# Patient Record
Sex: MALE | Race: WHITE | HISPANIC OR LATINO | Employment: FULL TIME | URBAN - METROPOLITAN AREA
[De-identification: names, ages, dates, MRNs, and addresses within clinical notes are randomized per-mention and may not be internally consistent; named-entity substitution may affect disease eponyms.]

---

## 2022-04-17 ENCOUNTER — OFFICE VISIT (OUTPATIENT)
Dept: URGENT CARE | Facility: CLINIC | Age: 42
End: 2022-04-17
Payer: COMMERCIAL

## 2022-04-17 VITALS
TEMPERATURE: 96.7 F | RESPIRATION RATE: 16 BRPM | WEIGHT: 315 LBS | BODY MASS INDEX: 44.1 KG/M2 | HEIGHT: 71 IN | SYSTOLIC BLOOD PRESSURE: 130 MMHG | HEART RATE: 75 BPM | DIASTOLIC BLOOD PRESSURE: 84 MMHG | OXYGEN SATURATION: 98 %

## 2022-04-17 DIAGNOSIS — J01.00 ACUTE NON-RECURRENT MAXILLARY SINUSITIS: Primary | ICD-10-CM

## 2022-04-17 PROCEDURE — 99213 OFFICE O/P EST LOW 20 MIN: CPT | Performed by: PHYSICIAN ASSISTANT

## 2022-04-17 RX ORDER — AMOXICILLIN AND CLAVULANATE POTASSIUM 875; 125 MG/1; MG/1
1 TABLET, FILM COATED ORAL EVERY 12 HOURS SCHEDULED
Qty: 14 TABLET | Refills: 0 | Status: SHIPPED | OUTPATIENT
Start: 2022-04-17 | End: 2022-04-24

## 2022-04-17 NOTE — PROGRESS NOTES
[address] [facility]        NAME: Silva Guerrero is a 42 y o  male  : 1980    MRN: [id number]  DATE: 2022  TIME: 2:31 PM    Assessment and Plan   Acute non-recurrent maxillary sinusitis [J01 00]  1  Acute non-recurrent maxillary sinusitis  amoxicillin-clavulanate (AUGMENTIN) 875-125 mg per tablet         Patient Instructions     Patient Instructions   Take antibiotic as prescribed  Continue over-the-counter allergy medication  Can take ibuprofen or Tylenol as needed for discomfort  Follow-up with PCP  Return or be seen in ER with any progressing or worsening symptoms  Follow up with PCP in 3-5 days  Proceed to  ER if symptoms worsen  Chief Complaint     Chief Complaint   Patient presents with    Sinusitis     Pt reports of worsening sinus pain and pressure x 4 days  History of Present Illness       Patient is a 40-year-old male presenting today with cold symptoms times 10 days  Patient notes last week he was experiencing some cold-like symptoms consisting of nasal congestion, myalgias and a cough, notes he was feeling better a few days ago per reports over the last couple days he has been experiencing some facial pain and pressure, has been taking over-the-counter ibuprofen but states no resolution of his symptoms, expresses concern of possible sinus infection  Denies fever, chills, N/V/D, nasal discharge or drainage  Review of Systems   Review of Systems   Constitutional: Negative for chills and fever  HENT: Positive for congestion, sinus pressure, sinus pain and sore throat  Negative for ear pain and trouble swallowing  Eyes: Negative for pain  Respiratory: Positive for cough  Negative for chest tightness and shortness of breath  Cardiovascular: Negative for chest pain  Gastrointestinal: Negative for abdominal pain  Musculoskeletal: Negative for myalgias  Skin: Negative for rash  Neurological: Negative for headaches           Current Medications       Current Outpatient Medications:     amoxicillin-clavulanate (AUGMENTIN) 875-125 mg per tablet, Take 1 tablet by mouth every 12 (twelve) hours for 7 days, Disp: 14 tablet, Rfl: 0    Current Allergies     Allergies as of 04/17/2022    (No Known Allergies)            The following portions of the patient's history were reviewed and updated as appropriate: allergies, current medications, past family history, past medical history, past social history, past surgical history and problem list      History reviewed  No pertinent past medical history  Past Surgical History:   Procedure Laterality Date    APPENDECTOMY         History reviewed  No pertinent family history  Medications have been verified  Objective   /84   Pulse 75   Temp (!) 96 7 °F (35 9 °C)   Resp 16   Ht 5' 11" (1 803 m)   Wt (!) 147 kg (325 lb)   SpO2 98%   BMI 45 33 kg/m²        Physical Exam     Physical Exam  Vitals reviewed  Constitutional:       General: He is not in acute distress  Appearance: He is well-developed  He is not toxic-appearing  HENT:      Head: Normocephalic and atraumatic  Right Ear: Tympanic membrane, ear canal and external ear normal       Left Ear: Tympanic membrane, ear canal and external ear normal       Nose: Congestion present  Right Sinus: Maxillary sinus tenderness present  No frontal sinus tenderness  Left Sinus: Maxillary sinus tenderness present  No frontal sinus tenderness  Mouth/Throat:      Mouth: Mucous membranes are moist       Pharynx: Oropharynx is clear  Eyes:      Conjunctiva/sclera: Conjunctivae normal    Cardiovascular:      Rate and Rhythm: Normal rate and regular rhythm  Pulses: Normal pulses  Pulmonary:      Effort: Pulmonary effort is normal       Breath sounds: Normal breath sounds  Musculoskeletal:      Cervical back: Normal range of motion  No tenderness  Lymphadenopathy:      Cervical: No cervical adenopathy  Skin:     General: Skin is warm  Capillary Refill: Capillary refill takes less than 2 seconds  Neurological:      General: No focal deficit present  Mental Status: He is alert and oriented to person, place, and time

## 2022-04-17 NOTE — PATIENT INSTRUCTIONS
Take antibiotic as prescribed  Continue over-the-counter allergy medication  Can take ibuprofen or Tylenol as needed for discomfort  Follow-up with PCP  Return or be seen in ER with any progressing or worsening symptoms

## 2022-08-01 ENCOUNTER — EVALUATION (OUTPATIENT)
Dept: PHYSICAL THERAPY | Facility: CLINIC | Age: 42
End: 2022-08-01
Payer: COMMERCIAL

## 2022-08-01 DIAGNOSIS — G89.29 CHRONIC LEFT SHOULDER PAIN: ICD-10-CM

## 2022-08-01 DIAGNOSIS — M75.42 IMPINGEMENT SYNDROME OF LEFT SHOULDER: Primary | ICD-10-CM

## 2022-08-01 DIAGNOSIS — M25.512 CHRONIC LEFT SHOULDER PAIN: ICD-10-CM

## 2022-08-01 PROCEDURE — 97162 PT EVAL MOD COMPLEX 30 MIN: CPT

## 2022-08-01 NOTE — LETTER
August 10, 2022    [address]  [facility] [address]    Patient: Sonny Dennison   YOB: 1980   Date of Visit: 2022     Encounter Diagnosis     ICD-10-CM    1  Impingement syndrome of left shoulder  M75 42    2  Chronic left shoulder pain  M25 512     G89 29        Dear Dr Venegas Arrow:    Thank you for your recent referral of Sonny Dennison  Please review the attached evaluation summary from Loser's recent visit  Please verify that you agree with the plan of care by signing the attached order  If you have any questions or concerns, please do not hesitate to call  I sincerely appreciate the opportunity to share in the care of one of your patients and hope to have another opportunity to work with you in the near future  Sincerely,    Dea Scanlon, PT      Referring Provider:      I certify that I have read the below Plan of Care and certify the need for these services furnished under this plan of treatment while under my care  Elan Hollis MD  [address] [facility] [address]  Via Fax: [phone]          PT Evaluation     Today's date: 2022  Patient name: Sonny Dennison  : 1980  MRN: [id number]  Referring provider: Irena Ernst MD  Dx:   Encounter Diagnosis     ICD-10-CM    1  Impingement syndrome of left shoulder  M75 42    2  Chronic left shoulder pain  M25 512     G89 29        Start Time: 1745  Stop Time: 1835  Total time in clinic (min): 50 minutes    Assessment  Assessment details: Sonny Dennison is a 42 y o  male who presents with pain, decreased strength, decreased ROM, impaired sensation and postural dysfunction  Due to these impairments, patient has difficulty performing ADL's, work-related activities, lifting/carrying, reaching   Patient's clinical presentation is consistent with their referring diagnosis of Impingement syndrome of left shoulder  (primary encounter diagnosis) & Chronic left shoulder pain  Patient has been educated in home exercise program and plan of care  Patient would benefit from skilled physical therapy services to address their aforementioned functional limitations and progress towards prior level of function and independence with home exercise program      Impairments: abnormal or restricted ROM, activity intolerance, impaired physical strength, lacks appropriate home exercise program, pain with function, scapular dyskinesis and poor posture   Functional limitations: sleep disturbed, difficulty reaching over head  Symptom irritability: moderateBarriers to therapy: Still working full time  Understanding of Dx/Px/POC: good   Prognosis: good    Goals  Short Term Goals to be met in 4 weeks (8/29/22)  1  Pt to be independent w/ prelimary HEP  2  Pt self-correcting posture with occasional cues in clinic for improved scapular positioning  3  Improve cervical AROM to full w/o pain  4  Full shoulder AROM without pain without weights  Long Term Goals to be met in 12 weeks (10/25/22)  1  Pain-free ROM overhead for return to active overhead mobility  2  Improve shoulder strength to 4+/5 or better to allow lifting, reaching and carrying w/o c/o   3  Undisturbed sleep  4  Pt to complete work/sport tasks w/o increased pain  5  Independent with comprehensive HEP         Plan  Plan details:       Patient would benefit from: PT eval and skilled physical therapy  Planned modality interventions: cryotherapy, thermotherapy: hydrocollator packs, unattended electrical stimulation and traction  Planned therapy interventions: manual therapy, neuromuscular re-education, therapeutic exercise, therapeutic activities, home exercise program, stretching, patient education, postural training and functional ROM exercises  Other planned therapy interventions: mechanical assessment  Frequency: 2x week (2-3x/week)  Duration in weeks: 12  Plan of Care beginning date: 8/1/2022  Plan of Care expiration date: 10/24/2022  Treatment plan discussed with: patient        Subjective Evaluation    Pain  Current pain rating: 3 (w/ flx, 1/10 w/ abd)  At best pain ratin  At worst pain ratin  Quality: grinding, burning and sharp  Relieving factors: change in position and ice  Aggravating factors: overhead activity and lifting  Progression: improved (since seeing MD)    Social Support  Lives in: multiple-level home  Lives with: spouse and young children    Employment status: working ([employer])  Hand dominance: left  Exercise history: None consistently    Treatments  Current treatment: physical therapy  Patient Goals  Patient goals for therapy: decreased pain, increased motion, increased strength and return to sport/leisure activities  Patient goal: To be able to use my arm overhead without pain        Objective  ROM  L  R  Date  22  Shld flx  150*  155  Shld abd 150*  150  Shld ER 90*  90  Shld IR  75*  83    MMT   L  R  Date   22  Shld flx  4+/5  5/5  Shld ext 4+/5  5/5  Shld abd 4/5*  5/5  Shld ER 4/5*  5/5  Shld IR  4+/5*  5/5    Palpation:   +pain with palpation of proximal biceps tendon  Mild tightness elsewhere noted  Posture:   Rounded shoulders, forward head, decreased lumbar lordosis    Observation:   Pt noted to have poor scapular stability during overhead movements  Progression of pain noted over the past several months, somewhat better since MD appointment  Cervical Screening:   ROM:   Flexion nil limitation  Extension nil limitation  Rotation L nil limitation, R nil limitation  Sidebend L: min limitation*, R: nil limitation    Repeated movement testing:   Cervical retraction: Increased strength noted following 1x10 w/ therapist guidance, but tingling/numbness into L forearm noted     Cervical protraction: NT           Precautions: standard, cervical    Daily Exercise Log:    Date 22        Visit # 1                 Manual         Traction (supine)         IASTM to prox bicep tendon                           Neuro Re-Ed         S/L shld ER w/ scap stab HEP        S/L shld abd w/ scap stab         scap retraction         High row w/ scap stab         OH shld ext w/ scap stab                           Ther Ex         Supine cerv retraction HEP                                                                       Ther Activity                           Gait Training                           Modalities         CP 10' to L shoulder, dec pain, skin intact pre-post                 HEP:   Access Code: [email or web address]  URL: [email or web address]/  Date: 08/01/2022  Prepared by: Rajwinder Corporal    Exercises  · Supine Chin Tuck - 2 x daily - 7 x weekly - 2 sets - 10 reps - 3 second hold  · Seated Correct Posture - 7 x weekly  · Sidelying Shoulder External Rotation AROM - 1 x daily - 7 x weekly - 2 sets - 10 reps  · Shoulder External Rotation and Scapular Retraction - 3-4 x weekly - 2 sets - 10 reps  · Seated Cervical Rotation AROM - 4 x daily - 7 x weekly - 2 sets - 10 reps

## 2022-08-01 NOTE — PROGRESS NOTES
PT Evaluation     Today's date: 2022  Patient name: Cely Snow  : 1980  MRN: [id number]  Referring provider: Sb Chinchilla MD  Dx:   Encounter Diagnosis     ICD-10-CM    1  Impingement syndrome of left shoulder  M75 42    2  Chronic left shoulder pain  M25 512     G89 29        Start Time: 1745  Stop Time: 1835  Total time in clinic (min): 50 minutes    Assessment  Assessment details: Cely Snow is a 42 y o  male who presents with pain, decreased strength, decreased ROM, impaired sensation and postural dysfunction  Due to these impairments, patient has difficulty performing ADL's, work-related activities, lifting/carrying, reaching  Patient's clinical presentation is consistent with their referring diagnosis of Impingement syndrome of left shoulder  (primary encounter diagnosis) & Chronic left shoulder pain  Patient has been educated in home exercise program and plan of care  Patient would benefit from skilled physical therapy services to address their aforementioned functional limitations and progress towards prior level of function and independence with home exercise program      Impairments: abnormal or restricted ROM, activity intolerance, impaired physical strength, lacks appropriate home exercise program, pain with function, scapular dyskinesis and poor posture   Functional limitations: sleep disturbed, difficulty reaching over head  Symptom irritability: moderateBarriers to therapy: Still working full time  Understanding of Dx/Px/POC: good   Prognosis: good    Goals  Short Term Goals to be met in 4 weeks (22)  1  Pt to be independent w/ prelimary HEP  2  Pt self-correcting posture with occasional cues in clinic for improved scapular positioning  3  Improve cervical AROM to full w/o pain  4  Full shoulder AROM without pain without weights  Long Term Goals to be met in 12 weeks (10/25/22)  1  Pain-free ROM overhead for return to active overhead mobility    2  Improve shoulder strength to 4+/5 or better to allow lifting, reaching and carrying w/o c/o   3  Undisturbed sleep  4  Pt to complete work/sport tasks w/o increased pain  5  Independent with comprehensive HEP         Plan  Plan details:       Patient would benefit from: PT eval and skilled physical therapy  Planned modality interventions: cryotherapy, thermotherapy: hydrocollator packs, unattended electrical stimulation and traction  Planned therapy interventions: manual therapy, neuromuscular re-education, therapeutic exercise, therapeutic activities, home exercise program, stretching, patient education, postural training and functional ROM exercises  Other planned therapy interventions: mechanical assessment  Frequency: 2x week (2-3x/week)  Duration in weeks: 12  Plan of Care beginning date: 2022  Plan of Care expiration date: 10/24/2022  Treatment plan discussed with: patient        Subjective Evaluation    Pain  Current pain rating: 3 (w/ flx, 1/10 w/ abd)  At best pain ratin  At worst pain ratin  Quality: grinding, burning and sharp  Relieving factors: change in position and ice  Aggravating factors: overhead activity and lifting  Progression: improved (since seeing MD)    Social Support  Lives in: multiple-level home  Lives with: spouse and young children    Employment status: working ([employer])  Hand dominance: left  Exercise history: None consistently    Treatments  Current treatment: physical therapy  Patient Goals  Patient goals for therapy: decreased pain, increased motion, increased strength and return to sport/leisure activities  Patient goal: To be able to use my arm overhead without pain        Objective  ROM  L  R  Date  22  Shld flx  150*  155  Shld abd 150*  150  Shld ER 90*  90  Shld IR  75*  83    MMT   L  R  Date   22  Shld flx  4+/5  5/5  Shld ext 4+/5  5/5  Shld abd 4/5*  5/5  Shld ER 4/5*  5/5  Shld IR  4+/5*  5/5    Palpation:   +pain with palpation of proximal biceps tendon  Mild tightness elsewhere noted  Posture:   Rounded shoulders, forward head, decreased lumbar lordosis    Observation:   Pt noted to have poor scapular stability during overhead movements  Progression of pain noted over the past several months, somewhat better since MD appointment  Cervical Screening:   ROM:   Flexion nil limitation  Extension nil limitation  Rotation L nil limitation, R nil limitation  Sidebend L: min limitation*, R: nil limitation    Repeated movement testing:   Cervical retraction: Increased strength noted following 1x10 w/ therapist guidance, but tingling/numbness into L forearm noted  Cervical protraction: NT           Precautions: standard, cervical    Daily Exercise Log:    Date 8/1/22        Visit # 1                 Manual         Traction (supine)         IASTM to prox bicep tendon                           Neuro Re-Ed         S/L shld ER w/ scap stab HEP        S/L shld abd w/ scap stab         scap retraction         High row w/ scap stab         OH shld ext w/ scap stab                           Ther Ex         Supine cerv retraction HEP                                                                       Ther Activity                           Gait Training                           Modalities         CP 10' to L shoulder, dec pain, skin intact pre-post                 HEP:   Access Code: [email or web address]  URL: [email or web address]  Date: 08/01/2022  Prepared by: Ruthie Nguyen    Exercises  · Supine Chin Tuck - 2 x daily - 7 x weekly - 2 sets - 10 reps - 3 second hold  · Seated Correct Posture - 7 x weekly  · Sidelying Shoulder External Rotation AROM - 1 x daily - 7 x weekly - 2 sets - 10 reps  · Shoulder External Rotation and Scapular Retraction - 3-4 x weekly - 2 sets - 10 reps  · Seated Cervical Rotation AROM - 4 x daily - 7 x weekly - 2 sets - 10 reps

## 2022-08-03 ENCOUNTER — OFFICE VISIT (OUTPATIENT)
Dept: PHYSICAL THERAPY | Facility: CLINIC | Age: 42
End: 2022-08-03
Payer: COMMERCIAL

## 2022-08-03 DIAGNOSIS — M75.42 IMPINGEMENT SYNDROME OF LEFT SHOULDER: ICD-10-CM

## 2022-08-03 DIAGNOSIS — M25.512 CHRONIC LEFT SHOULDER PAIN: Primary | ICD-10-CM

## 2022-08-03 DIAGNOSIS — G89.29 CHRONIC LEFT SHOULDER PAIN: Primary | ICD-10-CM

## 2022-08-03 DIAGNOSIS — M54.12 CERVICAL RADICULOPATHY: ICD-10-CM

## 2022-08-03 PROCEDURE — 97110 THERAPEUTIC EXERCISES: CPT

## 2022-08-03 PROCEDURE — 97140 MANUAL THERAPY 1/> REGIONS: CPT

## 2022-08-03 PROCEDURE — 97112 NEUROMUSCULAR REEDUCATION: CPT

## 2022-08-03 NOTE — PROGRESS NOTES
Daily Note     Today's date: 8/3/2022  Patient name: Gaston French  : 1980  MRN: [id number]  Referring provider: Liane Birch MD  Dx:   Encounter Diagnosis     ICD-10-CM    1  Chronic left shoulder pain  M25 512     G89 29    2  Impingement syndrome of left shoulder  M75 42    3  Cervical radiculopathy  M54 12        Start Time: 1115  Stop Time: 1700  Total time in clinic (min): 45 minutes    Subjective: Patient reports continued numbness in arm following ~5 reps of seated cervical retraction, sidebend or with driving in car  Patient reports no difficulty performing cervical retraction in supine, which he has been consistent with  Objective: See treatment diary below      Assessment: Tolerated treatment well and with somewhat improved tolerance for cervical extension without UE numbness following today's session  Good understanding of information presented on repositioning posture during driving  Patient demonstrated fatigue post treatment, exhibited good technique with therapeutic exercises and would benefit from continued PT      Plan: Continue per plan of care  Progress treatment as tolerated         Precautions: standard, cervical    Daily Exercise Log:    Date 8/1/22 8/3/22       Visit # 1 2                Manual  15'       IASTM to prox bicep tendon  LS and also to L scalenes       STM  Scalenes-LS       Supine sidegliding  cervical                                  Neuro Re-Ed  5'       S/L shld ER w/ scap stab HEP Sup IR/ER   at 90 deg 2x10       S/L shld abd w/ scap stab         scap retraction         High row w/ scap stab         OH shld ext w/ scap stab                           Ther Ex  18'       Supine cerv retraction HEP 2x10 to start       Supine shld flx  2x10       Supine chest press  3# on cane, 2x10       Pt education  Reviewed symptoms, response to exercises, seating in car, with adjustments made with improved tolerance noted                                           Ther Activity                           Gait Training                           Modalities  10'       CP 10' to L shoulder, dec pain, skin intact pre-post        Mechanical traction  2x5' 25# supine       HEP:   Access Code: [email or web address]  URL: [email or web address]  Date: 08/01/2022  Prepared by: Rivera Tinoco    Exercises  · Supine Chin Tuck - 2 x daily - 7 x weekly - 2 sets - 10 reps - 3 second hold  · Seated Correct Posture - 7 x weekly  · Sidelying Shoulder External Rotation AROM - 1 x daily - 7 x weekly - 2 sets - 10 reps  · Shoulder External Rotation and Scapular Retraction - 3-4 x weekly - 2 sets - 10 reps  · Seated Cervical Rotation AROM - 4 x daily - 7 x weekly - 2 sets - 10 reps

## 2022-08-04 ENCOUNTER — OFFICE VISIT (OUTPATIENT)
Dept: PHYSICAL THERAPY | Facility: CLINIC | Age: 42
End: 2022-08-04
Payer: COMMERCIAL

## 2022-08-04 DIAGNOSIS — M54.12 CERVICAL RADICULOPATHY: ICD-10-CM

## 2022-08-04 DIAGNOSIS — M75.42 IMPINGEMENT SYNDROME OF LEFT SHOULDER: ICD-10-CM

## 2022-08-04 DIAGNOSIS — M25.512 CHRONIC LEFT SHOULDER PAIN: Primary | ICD-10-CM

## 2022-08-04 DIAGNOSIS — G89.29 CHRONIC LEFT SHOULDER PAIN: Primary | ICD-10-CM

## 2022-08-04 PROCEDURE — 97112 NEUROMUSCULAR REEDUCATION: CPT

## 2022-08-04 PROCEDURE — 97110 THERAPEUTIC EXERCISES: CPT

## 2022-08-04 NOTE — PROGRESS NOTES
Daily Note     Today's date: 2022  Patient name: Louis Hunter  : 1980  MRN: [id number]  Referring provider: Agusto Camacho MD  Dx:   Encounter Diagnosis     ICD-10-CM    1  Chronic left shoulder pain  M25 512     G89 29    2  Impingement syndrome of left shoulder  M75 42    3  Cervical radiculopathy  M54 12                   Subjective: pt reports to session with no noted discomfort, reports he was sitting in his truck and found that the numbness into his arm isnt when he is sitting up but when his head is leaning back a little resting on the head rest  Reports overall feeling well after his session last night but the intermittent numbness persists  Objective: See treatment diary below      Assessment: Tolerated treatment well  Mild increases in ant L shoulder discomfort with B/L shoulder ER but overall tolerated additional postural strengthening and positioning today  VC/TC for proper cerv retract form and had decreased discomfort with subsequent repetitions of this  Pt dem significant tightness in thoracic spine mobility, cont to address as tolerated  Patient would benefit from continued PT      Plan: Continue per plan of care        Precautions: standard, cervical    Daily Exercise Log:    Date 8/1/22 8/3/22 2022      Visit # 1 2 3                Manual  15' 5'      IASTM to prox bicep tendon  LS and also to L scalenes       STM  Scalenes-LS       Supine sidegliding  cervical       Prone cerv/thoracic PA kyle    RR                         Neuro Re-Ed  5' 30'      S/L shld ER w/ scap stab HEP Sup IR/ER   at 90 deg 2x10       S/L shld abd w/ scap stab         scap retraction         High row w/ scap stab   kirt 10# 2x10       OH shld ext w/ scap stab   kirt 10# 2x10       Corner stretch    15"x5       B/L ER w/ scap set    GTB 2x10       Shoulder horz abd w/ scap set    GTB 2x10       Prone "I"   3"x10       Prone "T"    3"x10       Median nerve glides    1x10      Ulnar nerve glide 1x10                Ther Ex  18' 10'       UBE retro    3'       Seated cerv retraction w/ self OP    2x10       Seated thoracic ext w/ MB    2x10       Supine cerv retraction HEP 2x10 to start 2x10       Supine shld flx  2x10 W/ cane 5"x15       Supine chest press  3# on cane, 2x10       Pt education  Reviewed symptoms, response to exercises, seating in car, with adjustments made with improved tolerance noted                                           Ther Activity                           Gait Training                           Modalities  10'       CP 10' to L shoulder, dec pain, skin intact pre-post        Mechanical traction  2x5' 25# supine       HEP:   Access Code: [email or web address]  URL: [email or web address]  Date: 08/01/2022  Prepared by: Jewel Qureshi    Exercises  · Supine Chin Tuck - 2 x daily - 7 x weekly - 2 sets - 10 reps - 3 second hold  · Seated Correct Posture - 7 x weekly  · Sidelying Shoulder External Rotation AROM - 1 x daily - 7 x weekly - 2 sets - 10 reps  · Shoulder External Rotation and Scapular Retraction - 3-4 x weekly - 2 sets - 10 reps  · Seated Cervical Rotation AROM - 4 x daily - 7 x weekly - 2 sets - 10 reps

## 2022-08-08 ENCOUNTER — OFFICE VISIT (OUTPATIENT)
Dept: PHYSICAL THERAPY | Facility: CLINIC | Age: 42
End: 2022-08-08
Payer: COMMERCIAL

## 2022-08-08 DIAGNOSIS — M25.512 CHRONIC LEFT SHOULDER PAIN: Primary | ICD-10-CM

## 2022-08-08 DIAGNOSIS — M54.12 CERVICAL RADICULOPATHY: ICD-10-CM

## 2022-08-08 DIAGNOSIS — M75.42 IMPINGEMENT SYNDROME OF LEFT SHOULDER: ICD-10-CM

## 2022-08-08 DIAGNOSIS — G89.29 CHRONIC LEFT SHOULDER PAIN: Primary | ICD-10-CM

## 2022-08-08 PROCEDURE — 97110 THERAPEUTIC EXERCISES: CPT

## 2022-08-08 PROCEDURE — 97112 NEUROMUSCULAR REEDUCATION: CPT

## 2022-08-08 PROCEDURE — 97012 MECHANICAL TRACTION THERAPY: CPT

## 2022-08-08 NOTE — PROGRESS NOTES
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)  Increased numbness in posterior hand with cervical extension on arrival to therapy  Objective: See treatment diary below      Assessment: Tolerated treatment well and with decreased arm numbness following supine mechanical traction  Patient demonstrated fatigue post treatment, exhibited good technique with therapeutic exercises and would benefit from continued PT  Progression of activities noted overall  Plan: Continue per plan of care  Progress treatment as tolerated         Precautions: standard, cervical    Daily Exercise Log:  POC Expires 10/25/22  Date 8/1/22 8/3/22 2022 8/8/22     Visit # 1 2 3  4              Manual  15' 5'      IASTM to prox bicep tendon  LS and also to L scalenes       STM  Scalenes-LS       Supine sidegliding  cervical       Prone cerv/thoracic PA kyle    RR  LS                       Neuro Re-Ed  5' 30' 15'     S/L shld ER w/ scap stab HEP Sup IR/ER   at 90 deg 2x10       S/L shld abd w/ scap stab         scap retraction         High row w/ scap stab   kirt 10# 2x10       OH shld ext w/ scap stab   kirt 10# 2x10       B/L ER w/ scap set    GTB 2x10  GTB 2x10     Shoulder horz abd w/ scap set    GTB 2x10       Prone "I"   3"x10  1x10 3" hold     Prone "T"    3"x10  1x10     Median nerve glides    1x10 Radial nerve glides 20x     Ulnar nerve glide    1x10                Ther Ex  18' 10'  15' UBE retro    3'  3'     Seated cerv retraction w/ self OP    2x10  2x10, inc numbness in hand, relieved after stopping     Seated thoracic ext w/ MB    2x10       Supine cerv retraction HEP 2x10 to start 2x10       Supine shld flx  2x10 W/ cane 5"x15  2x10     Supine chest press  3# on cane, 2x10       Pt education  Reviewed symptoms, response to exercises, seating in car, with adjustments made with improved tolerance noted  Reviewed current symptoms     Wall slide    10x10"     Corner stretch   15"x5  10x10"                       Ther Activity                           Gait Training                           Modalities  10'  10'     CP 10' to L shoulder, dec pain, skin intact pre-post        Mechanical traction  2x5' 25# supine  Mechanical traction 2x5' 28#, 25#      HEP:   Access Code: [email or web address]  URL: [email or web address]  Date: 08/01/2022  Prepared by: Enrico Reining    Exercises  · Supine Chin Tuck - 2 x daily - 7 x weekly - 2 sets - 10 reps - 3 second hold  · Seated Correct Posture - 7 x weekly  · Sidelying Shoulder External Rotation AROM - 1 x daily - 7 x weekly - 2 sets - 10 reps  · Shoulder External Rotation and Scapular Retraction - 3-4 x weekly - 2 sets - 10 reps  · Seated Cervical Rotation AROM - 4 x daily - 7 x weekly - 2 sets - 10 reps

## 2022-08-10 ENCOUNTER — OFFICE VISIT (OUTPATIENT)
Dept: PHYSICAL THERAPY | Facility: CLINIC | Age: 42
End: 2022-08-10
Payer: COMMERCIAL

## 2022-08-10 DIAGNOSIS — G89.29 CHRONIC LEFT SHOULDER PAIN: Primary | ICD-10-CM

## 2022-08-10 DIAGNOSIS — M75.42 IMPINGEMENT SYNDROME OF LEFT SHOULDER: ICD-10-CM

## 2022-08-10 DIAGNOSIS — M54.12 CERVICAL RADICULOPATHY: ICD-10-CM

## 2022-08-10 DIAGNOSIS — M25.512 CHRONIC LEFT SHOULDER PAIN: Primary | ICD-10-CM

## 2022-08-10 PROCEDURE — 97112 NEUROMUSCULAR REEDUCATION: CPT

## 2022-08-10 PROCEDURE — 97110 THERAPEUTIC EXERCISES: CPT

## 2022-08-10 NOTE — PROGRESS NOTES
Daily Note     Today's date: 8/10/2022  Patient name: Louis Hunter  : 1980  MRN: [id number]  Referring provider: Agusto Camacho MD  Dx:   Encounter Diagnosis     ICD-10-CM    1  Chronic left shoulder pain  M25 512     G89 29    2  Cervical radiculopathy  M54 12    3  Impingement syndrome of left shoulder  M75 42                   Subjective: He was working a lot overhead and thinks this bothered him  His shoulder felt like it was "locking up" during this, pt reports today is a good day  Unsure that the traction provides any prolonged relief  Objective: See treatment diary below      Assessment: Tolerated treatment well  Pt dem production of numbness in L UE with thoracic seated ext today but no production of symptoms with anchored thor ext today  Pt had no increases in pain post session, "I think I may be sore tomorrow " Held mechanical tx today due to no reported significant relief with use  Patient would benefit from continued PT      Plan: Continue per plan of care        Precautions: standard, cervical    Daily Exercise Log:  POC Expires 10/25/22  Date 8/1/22 8/3/22 2022 8/8/22 8/10/2022    Visit # 1 2 3  4 5 FOTO              Manual  15' 5'      IASTM to prox bicep tendon  LS and also to L scalenes       STM  Scalenes-LS       Supine sidegliding  cervical       Prone cerv/thoracic PA mobs    RR  LS RR                       Neuro Re-Ed  5' 30' 15' 25'     S/L shld ER w/ scap stab HEP Sup IR/ER   at 90 deg 2x10       S/L shld abd w/ scap stab         scap retraction         High row w/ scap stab   kirt 10# 2x10   Pain     Bent over uni row 8# DB 2x10     OH shld ext w/ scap stab   kirt 10# 2x10   kirt 10# 2x10     B/L ER w/ scap set    GTB 2x10  GTB 2x10 RTB 2x10     Shoulder horz abd w/ scap set    GTB 2x10   GTB 2x10     Prone "I"   3"x10  1x10 3" hold 3"x10    Prone "T"    3"x10  1x10 3"x10     Radian nerve glides      2x10     Median nerve glides    1x10 Radial nerve glides 20x 2x10      Ulnar nerve glide    1x10   2x10              Ther Ex  18' 10'  15' 20'     UBE retro    3'  3' 4'     Seated cerv retraction w/ self OP    2x10  2x10, inc numbness in hand, relieved after stopping     Seated thoracic ext w/ MB    2x10   2x10 * numbness into L forearm    Rhomboid stretch anchored on rail for thoracic ext     10"x10     Supine cerv retraction HEP 2x10 to start 2x10       Supine shld flx  2x10 W/ cane 5"x15  2x10     Supine chest press  3# on cane, 2x10       Pt education  Reviewed symptoms, response to exercises, seating in car, with adjustments made with improved tolerance noted  Reviewed current symptoms     Wall slide    10x10" 10x10"    Corner stretch   15"x5  10x10" 10x10"                       Ther Activity                           Gait Training                           Modalities  10'  10'     CP 10' to L shoulder, dec pain, skin intact pre-post        Mechanical traction  2x5' 25# supine  Mechanical traction 2x5' 28#, 25#      HEP:   Access Code: [email or web address]  URL: [email or web address]  Date: 08/01/2022  Prepared by: Gardenia Floyd    Exercises  · Supine Chin Tuck - 2 x daily - 7 x weekly - 2 sets - 10 reps - 3 second hold  · Seated Correct Posture - 7 x weekly  · Sidelying Shoulder External Rotation AROM - 1 x daily - 7 x weekly - 2 sets - 10 reps  · Shoulder External Rotation and Scapular Retraction - 3-4 x weekly - 2 sets - 10 reps  · Seated Cervical Rotation AROM - 4 x daily - 7 x weekly - 2 sets - 10 reps

## 2022-08-11 ENCOUNTER — OFFICE VISIT (OUTPATIENT)
Dept: PHYSICAL THERAPY | Facility: CLINIC | Age: 42
End: 2022-08-11
Payer: COMMERCIAL

## 2022-08-11 DIAGNOSIS — M25.512 CHRONIC LEFT SHOULDER PAIN: Primary | ICD-10-CM

## 2022-08-11 DIAGNOSIS — M75.42 IMPINGEMENT SYNDROME OF LEFT SHOULDER: ICD-10-CM

## 2022-08-11 DIAGNOSIS — G89.29 CHRONIC LEFT SHOULDER PAIN: Primary | ICD-10-CM

## 2022-08-11 DIAGNOSIS — M54.12 CERVICAL RADICULOPATHY: ICD-10-CM

## 2022-08-11 PROCEDURE — 97110 THERAPEUTIC EXERCISES: CPT

## 2022-08-11 PROCEDURE — 97112 NEUROMUSCULAR REEDUCATION: CPT

## 2022-08-11 NOTE — PROGRESS NOTES
Daily Note     Today's date: 2022  Patient name: Shannen Green  : 1980  MRN: [id number]  Referring provider: Amaya Andrew MD  Dx:   Encounter Diagnosis     ICD-10-CM    1  Chronic left shoulder pain  M25 512     G89 29    2  Cervical radiculopathy  M54 12    3  Impingement syndrome of left shoulder  M75 42                   Subjective: pt reports that he was tired after his last session, he woke up this morning with his shoulder being sore he took some meds and made it feel better, no noted pain on arrival to session  Objective: See treatment diary below  Repeated shoulder ext w/ cane 1x10=Dec/B   Repeat shoulder ext hand on counter 1x10=Dec/ B w/ AROM shoulder flex   Repeated L SB= Incr/W numbness into L median nerve distribution       Assessment: Tolerated treatment well  Pt had production of L hand numbness with retract/ext/manual tx/central sliders today, does not appear to present with clear directional preference today  Pt will be away for 2 weeks, edu to cont with repeated shoulder ext and HEP that is not producing symptoms while away until his return  Cont to be aware of provoking positions  Patient would benefit from continued PT      Plan: Continue per plan of care        Precautions: standard, cervical    Daily Exercise Log:  POC Expires 10/25/22  Date 8/1/22 8/3/22 2022 8/8/22 8/10/2022 2022   Visit # 1 2 3  4 5 FOTO  6             Manual  15' 5'   5'    IASTM to prox bicep tendon  LS and also to L scalenes       STM  Scalenes-LS       Supine sidegliding  cervical       Prone cerv/thoracic PA mobs    RR  LS RR     Manual tx      RR    Central sliders median bias       1x10   Neuro Re-Ed  5' 30' 15' 25'  10'    S/L shld ER w/ scap stab HEP Sup IR/ER   at 90 deg 2x10       S/L shld abd w/ scap stab         scap retraction         High row w/ scap stab   kirt 10# 2x10   Pain     Bent over uni row 8# DB 2x10     OH shld ext w/ scap stab   kirt 10# 2x10   kirt 10# 2x10 B/L ER w/ scap set    GTB 2x10  GTB 2x10 RTB 2x10     Shoulder horz abd w/ scap set    GTB 2x10   GTB 2x10     Prone "I"   3"x10  1x10 3" hold 3"x10    Prone "T"    3"x10  1x10 3"x10     Radian nerve glides      2x10     Median nerve glides    1x10 Radial nerve glides 20x 2x10   Median Standing 1x10 wrist ext neutral arm * numbness    Ulnar nerve glide    1x10   2x10              Ther Ex  18' 10'  15' 20'  30'    UBE retro    3'  3' 4'  4'  Numbness into hand last min    Seated cerv retraction w/ self OP    2x10  2x10, inc numbness in hand, relieved after stopping  L hand numbness    Seated thoracic ext w/ MB    2x10   2x10 * numbness into L forearm    Rhomboid stretch anchored on rail for thoracic ext     10"x10     Supine cerv retraction HEP 2x10 to start 2x10       Supine shld flx  2x10 W/ cane 5"x15  2x10     Supine chest press  3# on cane, 2x10       Pt education  Reviewed symptoms, response to exercises, seating in car, with adjustments made with improved tolerance noted  Reviewed current symptoms     Wall slide    10x10" 10x10"    Corner stretch   15"x5  10x10" 10x10"  10"x10    Mechanical assessment      20'             Ther Activity                           Gait Training                           Modalities  10'  10'     CP 10' to L shoulder, dec pain, skin intact pre-post        Mechanical traction  2x5' 25# supine  Mechanical traction 2x5' 28#, 25#      HEP:   Access Code: [email or web address]  URL: [email or web address]  Date: 08/01/2022  Prepared by: Erika Barrier    Exercises  · Supine Chin Tuck - 2 x daily - 7 x weekly - 2 sets - 10 reps - 3 second hold  · Seated Correct Posture - 7 x weekly  · Sidelying Shoulder External Rotation AROM - 1 x daily - 7 x weekly - 2 sets - 10 reps  · Shoulder External Rotation and Scapular Retraction - 3-4 x weekly - 2 sets - 10 reps  · Seated Cervical Rotation AROM - 4 x daily - 7 x weekly - 2 sets - 10 reps

## 2022-08-15 ENCOUNTER — APPOINTMENT (OUTPATIENT)
Dept: PHYSICAL THERAPY | Facility: CLINIC | Age: 42
End: 2022-08-15
Payer: COMMERCIAL

## 2022-08-17 ENCOUNTER — APPOINTMENT (OUTPATIENT)
Dept: PHYSICAL THERAPY | Facility: CLINIC | Age: 42
End: 2022-08-17
Payer: COMMERCIAL

## 2022-08-18 ENCOUNTER — APPOINTMENT (OUTPATIENT)
Dept: PHYSICAL THERAPY | Facility: CLINIC | Age: 42
End: 2022-08-18
Payer: COMMERCIAL

## 2022-08-22 ENCOUNTER — EVALUATION (OUTPATIENT)
Dept: PHYSICAL THERAPY | Facility: CLINIC | Age: 42
End: 2022-08-22
Payer: COMMERCIAL

## 2022-08-22 DIAGNOSIS — M54.12 CERVICAL RADICULOPATHY: ICD-10-CM

## 2022-08-22 DIAGNOSIS — M25.512 CHRONIC LEFT SHOULDER PAIN: Primary | ICD-10-CM

## 2022-08-22 DIAGNOSIS — M75.42 IMPINGEMENT SYNDROME OF LEFT SHOULDER: ICD-10-CM

## 2022-08-22 DIAGNOSIS — G89.29 CHRONIC LEFT SHOULDER PAIN: Primary | ICD-10-CM

## 2022-08-22 PROCEDURE — 97140 MANUAL THERAPY 1/> REGIONS: CPT

## 2022-08-22 PROCEDURE — 97110 THERAPEUTIC EXERCISES: CPT

## 2022-08-22 PROCEDURE — 97112 NEUROMUSCULAR REEDUCATION: CPT

## 2022-08-22 NOTE — PROGRESS NOTES
PT Re-Evaluation     Today's date: 2022  Patient name: Kinza Dolan  : 1980  MRN: [id number]  Referring provider: Adriana Sanders MD  Dx:   Encounter Diagnosis     ICD-10-CM    1  Chronic left shoulder pain  M25 512     G89 29    2  Cervical radiculopathy  M54 12    3  Impingement syndrome of left shoulder  M75 42        Start Time: 1700  Stop Time: 1745  Total time in clinic (min): 45 minutes    Assessment  Assessment details: 22:   Patient demonstrating improving ROM, strength, decreased guarding and increased shoulder function  Patient notes he did go to chiropractor who adjusted his back, with decreased numbness noted since then  Patient progressing well toward patient and therapist goals, with patient demonstrating continued impaired strength in external rotation and abduction, with pain remaining in these directions as well  Patient noting overall improvements in function, with continued impairments noted as above  Patient will benefit from continued skilled PT to address strengthening, stretching, scapular stabilization, and scapulohumeral rhythm to prevent continued impingement in his L arm      22: Kinza Dolan is a 42 y o  male who presents with pain, decreased strength, decreased ROM, impaired sensation and postural dysfunction  Due to these impairments, patient has difficulty performing ADL's, work-related activities, lifting/carrying, reaching  Patient's clinical presentation is consistent with their referring diagnosis of Impingement syndrome of left shoulder  (primary encounter diagnosis) & Chronic left shoulder pain  Patient has been educated in home exercise program and plan of care   Patient would benefit from skilled physical therapy services to address their aforementioned functional limitations and progress towards prior level of function and independence with home exercise program      Impairments: abnormal or restricted ROM, impaired physical strength, pain with function, scapular dyskinesis and poor posture   Functional limitations: sleep disturbed, difficulty reaching over head  Symptom irritability: moderateBarriers to therapy: Still working full time in a physical job  Understanding of Dx/Px/POC: good   Prognosis: good    Goals  Short Term Goals to be met in 4 weeks (8/29/22)  1  Pt to be independent w/ prelimary HEP  MET  2  Pt self-correcting posture with occasional cues in clinic for improved scapular positioning  MET  3  Improve cervical AROM to full w/o pain  MET  4  Full shoulder AROM without pain without weights  MET    Long Term Goals to be met in 12 weeks (10/25/22) all progressing--not yet achieved  Continue goals  1  Pain-free ROM overhead for return to active overhead mobility  2  Improve shoulder strength to 4+/5 or better to allow lifting, reaching and carrying w/o c/o   3  Undisturbed sleep  4  Pt to complete work/sport tasks w/o increased pain  5  Independent with comprehensive HEP  Plan  Plan details:       Patient would benefit from: skilled physical therapy  Planned modality interventions: cryotherapy, thermotherapy: hydrocollator packs, unattended electrical stimulation and traction  Planned therapy interventions: manual therapy, neuromuscular re-education, therapeutic exercise, therapeutic activities, home exercise program, stretching, patient education, postural training, functional ROM exercises and joint mobilization  Other planned therapy interventions: mechanical assessment  Frequency: 2-3x/week  Duration in weeks: 12  Plan of Care beginning date: 8/1/2022  Plan of Care expiration date: 10/24/2022  Treatment plan discussed with: patient        Subjective Evaluation    History of Present Illness  Mechanism of injury: Patient reports gradual onset of pain in his left shoulder, which he has ignored for many months   With recent onset of numbness into his arm, decided to seek medical attention and presents for OPPT following visit to orthopedic MD            Not a recurrent problem   Quality of life: good    Pain  Current pain ratin (w/ flx, 10 w/ abd)  At best pain ratin  At worst pain ratin  Location: L shoulder  Quality: grinding, sharp, dull ache and discomfort  Relieving factors: change in position and ice  Aggravating factors: overhead activity and lifting  Progression: improved (since seeing MD)    Social Support  Lives in: multiple-level home  Lives with: spouse and young children    Employment status: working ([address])  Hand dominance: left  Exercise history: None consistently    Treatments  Current treatment: chiropractic and physical therapy  Patient Goals  Patient goals for therapy: decreased pain, increased motion, increased strength and return to sport/leisure activities  Patient goal: To be able to use my arm overhead without pain        Objective  ROM  L  L  R  Date  22  Shld flx  158  150*  155  Shld abd 162  150*  150  Shld ER 75*  90*  90  Shld IR  63  75*  83    MMT   L  L  R  Date   22  Shld flx  4+/5  4+/5  5/5  Shld ext 5/5  4+/5  5/5  Shld abd 4+/5  4/5*  5/5  Shld ER 4-/5*  4/5*  5/5  Shld IR  4+/5  4+/5*  5/5    Palpation:   22: No pain with palpation of biceps tendon or around shoulder mm  Patient notes tightness with palpation but without the sharp pain he was having before  22: +pain with palpation of proximal biceps tendon  Mild tightness elsewhere noted  Posture:  22: Continues to utilize forward head posture, with some improvement noted overall  Able to correct better with cues  22: Rounded shoulders, forward head, decreased lumbar lordosis    Observation:   22: Improving scapular stabilization with overhead reach, but with continued requirement for tactile or verbal cues, with patient noted to have improving ease of initiating scapular stabilization    22: Pt noted to have poor scapular stability during overhead movements  Progression of pain noted over the past several months, somewhat better since MD appointment  Cervical Screening:   ROM:   22:   Flexion nil limitation  Extension nil limitation  Rotation L nil limitation, R nil limitation  Sidebend L: nil limitation, R: nil limitation    22:   Flexion nil limitation  Extension nil limitation  Rotation L nil limitation, R nil limitation  Sidebend L: min limitation*, R: nil limitation    Repeated movement testin22: Not tested, pt reporting no numbness since chiropractic adjustment on   Notes improved ease of completing cervical retractions without UE numbness now  22:   Cervical retraction: Increased strength noted following 1x10 w/ therapist guidance, but tingling/numbness into L forearm noted     Cervical protraction: NT         Precautions: standard, cervical    Daily Exercise Log:  POC Expires 10/25/22  Date 22   Visit # 7     6             Manual 5'     5'    IASTM to prox bicep tendon         STM         Prone cerv/thoracic PA mobs          Manual tx LS     RR    Central sliders median bias       1x10                     Neuro Re-Ed 20'     10'    S/L shld ER w/ scap stab 2x10 tactile/ verbal cues for scap stab        S/L shld abd w/ scap stab 2x10, tactile/ verbal cues for scap stab        scap retraction         High row w/ scap stab         OH shld ext w/ scap stab 9 1# [place] 2x10        B/L ER w/ scap set          Shoulder horz abd w/ scap set          Prone "I" 2x10        Prone "T"  2x10        Radian nerve glides          Median nerve glides  Median standing 20x wrist ext neutral arm, no numbness     Median Standing 1x10 wrist ext neutral arm * numbness    Ulnar nerve glide                   Ther Ex 15'     30'    UBE retro       4'  Numbness into hand last min    Seated cerv retraction w/ self OP  held     L hand numbness    Seated thoracic ext w/ MB          Rhomboid stretch anchored on rail for thoracic ext         Supine cerv retraction         Supine shld flx         Supine chest press         Shoulder extension stretch w/ cane 10x10"        Pt education         Wall slide 10"x5        Corner stretch 10"x5     10"x10    Mechanical assessment      20'    ROM/MMT reassess LS                                            Ther Activity                           Gait Training                           Modalities         CP         Mechanical traction         HEP:   Access Code: [email or web address]  URL: [email or web address]  Date: 08/01/2022  Prepared by: Miracle Milner    Exercises  · Supine Chin Tuck - 2 x daily - 7 x weekly - 2 sets - 10 reps - 3 second hold  · Seated Correct Posture - 7 x weekly  · Sidelying Shoulder External Rotation AROM - 1 x daily - 7 x weekly - 2 sets - 10 reps  · Shoulder External Rotation and Scapular Retraction - 3-4 x weekly - 2 sets - 10 reps  · Seated Cervical Rotation AROM - 4 x daily - 7 x weekly - 2 sets - 10 reps

## 2022-08-24 ENCOUNTER — APPOINTMENT (OUTPATIENT)
Dept: PHYSICAL THERAPY | Facility: CLINIC | Age: 42
End: 2022-08-24
Payer: COMMERCIAL

## 2022-08-25 ENCOUNTER — OFFICE VISIT (OUTPATIENT)
Dept: PHYSICAL THERAPY | Facility: CLINIC | Age: 42
End: 2022-08-25
Payer: COMMERCIAL

## 2022-08-25 DIAGNOSIS — G89.29 CHRONIC LEFT SHOULDER PAIN: Primary | ICD-10-CM

## 2022-08-25 DIAGNOSIS — M75.42 IMPINGEMENT SYNDROME OF LEFT SHOULDER: ICD-10-CM

## 2022-08-25 DIAGNOSIS — M54.12 CERVICAL RADICULOPATHY: ICD-10-CM

## 2022-08-25 DIAGNOSIS — M25.512 CHRONIC LEFT SHOULDER PAIN: Primary | ICD-10-CM

## 2022-08-25 PROCEDURE — 97110 THERAPEUTIC EXERCISES: CPT

## 2022-08-25 PROCEDURE — 97112 NEUROMUSCULAR REEDUCATION: CPT

## 2022-08-25 NOTE — PROGRESS NOTES
Daily Note     Today's date: 2022  Patient name: Earnest Hayes  : 1980  MRN: [id number]  Referring provider: Sara Rooj MD  Dx:   Encounter Diagnosis     ICD-10-CM    1  Chronic left shoulder pain  M25 512     G89 29    2  Cervical radiculopathy  M54 12    3  Impingement syndrome of left shoulder  M75 42        Start Time: 1700  Stop Time: 1745  Total time in clinic (min): 45 minutes    Subjective: Patient reporting pain only in the morning now  Overall, improvements noted  Objective: See treatment diary below      Assessment: Tolerated treatment well and with mild-moderate fatigue and soreness noted by end of session  Progression of activities tolerated well, with continued cues and emphasis required for scapular stabilization  Patient demonstrated fatigue post treatment, exhibited good technique with therapeutic exercises and would benefit from continued PT      Plan: Continue per plan of care  Progress treatment as tolerated         Precautions: standard, cervical    Daily Exercise Log:  POC Expires 10/25/22  Date 22       Visit # 7 8                Manual 5'        IASTM to prox bicep tendon         STM         Prone cerv/thoracic PA mobs          Manual tx LS        Central sliders median bias                            Neuro Re-Ed 20' 20'       S/L shld ER w/ scap stab 2x10 tactile/ verbal cues for scap stab        S/L shld abd w/ scap stab 2x10, tactile/ verbal cues for scap stab        scap retraction  Green tubing 2x15       High row w/ scap stab  Seated 2x15 11 4# B/L       OH shld ext w/ scap stab 9 1# [place] 2x10 Green tubing  2x10       B/L ER w/ scap set   RTB 2x15       Shoulder horz abd w/ scap set  2x10 2x10       Prone "I" 2x10 2x10 w/ scap set       Radian nerve glides          Median nerve glides  Median standing 20x wrist ext neutral arm, no numbness        Ulnar nerve glide                   Ther Ex 15' 19'       Pulleys   3'       UBE retro   L1 x 3' Seated cerv retraction w/ self OP  held        Seated thoracic ext w/ MB          Supine cerv retraction         Supine shld flx  3# 2x10 in painfree ROM       Supine chest press  3# 2x10 B/L in painfree ROM       Shoulder extension stretch w/ cane 10x10" 10"x10       Pt education         Wall slide 10"x5 10"x5       Corner stretch 10"x5 15"x5        Mechanical assessment         ROM/MMT reassess LS                                            Ther Activity                           Gait Training                           Modalities         CP         Mechanical traction         HEP:   Access Code: [email or web address]  URL: [email or web address]  Date: 08/01/2022  Prepared by: Phylicia Pear    Exercises  · Supine Chin Tuck - 2 x daily - 7 x weekly - 2 sets - 10 reps - 3 second hold  · Seated Correct Posture - 7 x weekly  · Sidelying Shoulder External Rotation AROM - 1 x daily - 7 x weekly - 2 sets - 10 reps  · Shoulder External Rotation and Scapular Retraction - 3-4 x weekly - 2 sets - 10 reps  · Seated Cervical Rotation AROM - 4 x daily - 7 x weekly - 2 sets - 10 reps

## 2022-08-29 ENCOUNTER — OFFICE VISIT (OUTPATIENT)
Dept: PHYSICAL THERAPY | Facility: CLINIC | Age: 42
End: 2022-08-29
Payer: COMMERCIAL

## 2022-08-29 DIAGNOSIS — G89.29 CHRONIC LEFT SHOULDER PAIN: Primary | ICD-10-CM

## 2022-08-29 DIAGNOSIS — M25.512 CHRONIC LEFT SHOULDER PAIN: Primary | ICD-10-CM

## 2022-08-29 DIAGNOSIS — M54.12 CERVICAL RADICULOPATHY: ICD-10-CM

## 2022-08-29 DIAGNOSIS — M75.42 IMPINGEMENT SYNDROME OF LEFT SHOULDER: ICD-10-CM

## 2022-08-29 PROCEDURE — 97110 THERAPEUTIC EXERCISES: CPT

## 2022-08-29 PROCEDURE — 97112 NEUROMUSCULAR REEDUCATION: CPT

## 2022-08-29 NOTE — PROGRESS NOTES
PT Discharge    Today's date: 2022  Patient name: Osbaldo Howell  : 1980  MRN: [id number]  Referring provider: Shalom Bobby MD  Dx:   Encounter Diagnosis     ICD-10-CM    1  Chronic left shoulder pain  M25 512     G89 29    2  Cervical radiculopathy  M54 12    3  Impingement syndrome of left shoulder  M75 42        Start Time: 1700  Stop Time: 1735  Total time in clinic (min): 35 minutes    Assessment  Assessment details: 22: Patient arrived to session reporting feeling back to his baseline, which is some mild pain typically, and informed therapist that today would be his last session  Patient demonstrating good understanding of HEP which was updated today  Patient aware that he can return if necessary in the future  Pleased with his results at this time  22:   Patient demonstrating improving ROM, strength, decreased guarding and increased shoulder function  Patient notes he did go to chiropractor who adjusted his back, with decreased numbness noted since then  Patient progressing well toward patient and therapist goals, with patient demonstrating continued impaired strength in external rotation and abduction, with pain remaining in these directions as well  Patient noting overall improvements in function, with continued impairments noted as above  Patient will benefit from continued skilled PT to address strengthening, stretching, scapular stabilization, and scapulohumeral rhythm to prevent continued impingement in his L arm      22: Osbaldo Howell is a 42 y o  male who presents with pain, decreased strength, decreased ROM, impaired sensation and postural dysfunction  Due to these impairments, patient has difficulty performing ADL's, work-related activities, lifting/carrying, reaching  Patient's clinical presentation is consistent with their referring diagnosis of Impingement syndrome of left shoulder  (primary encounter diagnosis) & Chronic left shoulder pain   Patient has been educated in home exercise program and plan of care  Patient would benefit from skilled physical therapy services to address their aforementioned functional limitations and progress towards prior level of function and independence with home exercise program      Barriers to therapy: Still working full time in a physical job  Understanding of Dx/Px/POC: good   Prognosis: good    Goals  Short Term Goals to be met in 4 weeks (22)  1  Pt to be independent w/ prelimary HEP  MET  2  Pt self-correcting posture with occasional cues in clinic for improved scapular positioning  MET  3  Improve cervical AROM to full w/o pain  MET  4  Full shoulder AROM without pain without weights  MET    Long Term Goals to be met in 12 weeks (10/25/22)  1  Pain-free ROM overhead for return to active overhead mobility  MET  2  Improve shoulder strength to 4+/5 or better to allow lifting, reaching and carrying w/o c/o  Mostly MET  3  Undisturbed sleep  MET  4  Pt to complete work/sport tasks w/o increased pain  MET  5  Independent with comprehensive HEP  MET      Plan  Plan details:       Frequency: 2-3x/week  Duration in weeks: 12  Plan of Care beginning date: 2022  Plan of Care expiration date: 10/24/2022  Treatment plan discussed with: patient        Subjective Evaluation    History of Present Illness  Mechanism of injury: Patient reports gradual onset of pain in his left shoulder, which he has ignored for many months   With recent onset of numbness into his arm, decided to seek medical attention and presents for OPPT following visit to orthopedic MD            Not a recurrent problem   Quality of life: good    Pain  Current pain ratin  At best pain ratin  At worst pain ratin  Location: L shoulder  Progression: resolved (to baseline symptoms)    Social Support  Lives in: multiple-level home  Lives with: spouse and young children    Employment status: working ([address])  Hand dominance: left  Exercise history: None consistently    Treatments  Current treatment: chiropractic and physical therapy        Objective  ROM  L  L  L  R  Date  22  Shld flx  160  158  150*  155  Shld abd 168  162  150*  150  Shld ER 80  75*  90*  90  Shld IR  75  63  75*  83    MMT   L  L  L  R  Date   22  Shld flx  4+/5  4+/5  4+/5  5/5  Shld ext 5/5  5/5  4+/5  5/5  Shld abd 4+/5  4+/5  4/5*  5/5  Shld ER 4/5  4-/5*  4/5*  5/5  Shld IR  4+/5*  4+/5  4+/5*  5/5    Palpation:   22: No pain with palpation of biceps tendon or around shoulder mm  Patient notes tightness with palpation but without the sharp pain he was having before  22: +pain with palpation of proximal biceps tendon  Mild tightness elsewhere noted  Posture:  22: Patient noted to have some improvements in posture  Progressing well overall  22: Continues to utilize forward head posture, with some improvement noted overall  Able to correct better with cues  22: Rounded shoulders, forward head, decreased lumbar lordosis    Observation:   22: Improving scapular stabilization with overhead reach, but with continued requirement for tactile or verbal cues, with patient noted to have improving ease of initiating scapular stabilization  22: Pt noted to have poor scapular stability during overhead movements  Progression of pain noted over the past several months, somewhat better since MD appointment  Cervical Screening:   ROM:   22:   Flexion nil limitation  Extension nil limitation  Rotation L nil limitation, R nil limitation  Sidebend L: nil limitation, R: nil limitation    22:   Flexion nil limitation  Extension nil limitation  Rotation L nil limitation, R nil limitation  Sidebend L: min limitation*, R: nil limitation    Repeated movement testin22: Not tested, pt reporting no numbness since chiropractic adjustment on    Notes improved ease of completing cervical retractions without UE numbness now  8/1/22:   Cervical retraction: Increased strength noted following 1x10 w/ therapist guidance, but tingling/numbness into L forearm noted  Cervical protraction: NT         Precautions: standard, cervical    Daily Exercise Log:  POC Expires 10/25/22  Date 8/22/22 8/25/22 8/29/22      Visit # 7 8 9               Manual 5'        IASTM to prox bicep tendon         STM         Prone cerv/thoracic PA mobs          Manual tx LS        Central sliders median bias                            Neuro Re-Ed 20' 20' 10'      S/L shld ER w/ scap stab 2x10 tactile/ verbal cues for scap stab        S/L shld abd w/ scap stab 2x10, tactile/ verbal cues for scap stab        scap retraction  Green tubing 2x15 RTB 2x15      High row w/ scap stab  Seated 2x15 11 4# B/L       OH shld ext w/ scap stab 9 1# [place] 2x10 Green tubing  2x10       B/L ER w/ scap set   RTB 2x15 RTB std 2x10      Shoulder horz abd w/ scap set  2x10 2x10 Standing RTB 2x10      Prone "I" 2x10 2x10 w/ scap set       Radian nerve glides          Median nerve glides  Median standing 20x wrist ext neutral arm, no numbness        Ulnar nerve glide                   Ther Ex 15' 19' 20'      Pulleys   3'       UBE retro   L1 x 3'        Seated cerv retraction w/ self OP  held        Seated thoracic ext w/ MB          Supine cerv retraction         Supine shld flx  3# 2x10 in painfree ROM       Supine chest press  3# 2x10 B/L in painfree ROM       Shoulder extension stretch w/ cane 10x10" 10"x10       Pt education         Wall slide 10"x5 10"x5 10"x5      Corner stretch 10"x5 15"x5  10"x5      Mechanical assessment         ROM/MMT reassess LS  LS      HEP   Updated & reviewed                                 Ther Activity                           Gait Training                           Modalities         CP         Mechanical traction         HEP:   Access Code: [email or web address]  URL: [email or web address]  Date: 08/01/2022  Prepared by: Rivera Tinoco    Exercises  · Supine Chin Tuck - 2 x daily - 7 x weekly - 2 sets - 10 reps - 3 second hold  · Seated Correct Posture - 7 x weekly  · Sidelying Shoulder External Rotation AROM - 1 x daily - 7 x weekly - 2 sets - 10 reps  · Shoulder External Rotation and Scapular Retraction - 3-4 x weekly - 2 sets - 10 reps  · Seated Cervical Rotation AROM - 4 x daily - 7 x weekly - 2 sets - 10 reps

## 2025-01-19 ENCOUNTER — APPOINTMENT (OUTPATIENT)
Dept: LAB | Facility: HOSPITAL | Age: 45
End: 2025-01-19
Payer: COMMERCIAL

## 2025-01-19 DIAGNOSIS — R73.03 DIABETES MELLITUS, LATENT: ICD-10-CM

## 2025-01-19 DIAGNOSIS — E78.00 PURE HYPERCHOLESTEROLEMIA: ICD-10-CM

## 2025-01-19 LAB
ALBUMIN SERPL BCG-MCNC: 4.3 G/DL (ref 3.5–5)
ALP SERPL-CCNC: 59 U/L (ref 34–104)
ALT SERPL W P-5'-P-CCNC: 22 U/L (ref 7–52)
ANION GAP SERPL CALCULATED.3IONS-SCNC: 7 MMOL/L (ref 4–13)
AST SERPL W P-5'-P-CCNC: 15 U/L (ref 13–39)
BILIRUB SERPL-MCNC: 0.42 MG/DL (ref 0.2–1)
BUN SERPL-MCNC: 17 MG/DL (ref 5–25)
CALCIUM SERPL-MCNC: 8.6 MG/DL (ref 8.4–10.2)
CHLORIDE SERPL-SCNC: 106 MMOL/L (ref 96–108)
CHOLEST SERPL-MCNC: 180 MG/DL (ref ?–200)
CO2 SERPL-SCNC: 26 MMOL/L (ref 21–32)
CREAT SERPL-MCNC: 0.99 MG/DL (ref 0.6–1.3)
EST. AVERAGE GLUCOSE BLD GHB EST-MCNC: 120 MG/DL
GFR SERPL CREATININE-BSD FRML MDRD: 91 ML/MIN/1.73SQ M
GLUCOSE P FAST SERPL-MCNC: 116 MG/DL (ref 65–99)
HBA1C MFR BLD: 5.8 %
HDLC SERPL-MCNC: 44 MG/DL
LDLC SERPL CALC-MCNC: 124 MG/DL (ref 0–100)
NONHDLC SERPL-MCNC: 136 MG/DL
POTASSIUM SERPL-SCNC: 4.1 MMOL/L (ref 3.5–5.3)
PROT SERPL-MCNC: 7.1 G/DL (ref 6.4–8.4)
SODIUM SERPL-SCNC: 139 MMOL/L (ref 135–147)
TRIGL SERPL-MCNC: 62 MG/DL (ref ?–150)
TSH SERPL DL<=0.05 MIU/L-ACNC: 1.48 UIU/ML (ref 0.45–4.5)

## 2025-01-19 PROCEDURE — 80053 COMPREHEN METABOLIC PANEL: CPT

## 2025-01-19 PROCEDURE — 83036 HEMOGLOBIN GLYCOSYLATED A1C: CPT

## 2025-01-19 PROCEDURE — 36415 COLL VENOUS BLD VENIPUNCTURE: CPT

## 2025-01-19 PROCEDURE — 80061 LIPID PANEL: CPT

## 2025-01-19 PROCEDURE — 84443 ASSAY THYROID STIM HORMONE: CPT
